# Patient Record
Sex: FEMALE | Race: WHITE
[De-identification: names, ages, dates, MRNs, and addresses within clinical notes are randomized per-mention and may not be internally consistent; named-entity substitution may affect disease eponyms.]

---

## 2018-05-21 ENCOUNTER — HOSPITAL ENCOUNTER (EMERGENCY)
Dept: HOSPITAL 17 - PHEFT | Age: 48
Discharge: HOME | End: 2018-05-21
Payer: COMMERCIAL

## 2018-05-21 VITALS — WEIGHT: 164.24 LBS | HEIGHT: 67 IN | BODY MASS INDEX: 25.78 KG/M2

## 2018-05-21 VITALS
DIASTOLIC BLOOD PRESSURE: 74 MMHG | HEART RATE: 82 BPM | SYSTOLIC BLOOD PRESSURE: 145 MMHG | TEMPERATURE: 98.3 F | RESPIRATION RATE: 16 BRPM | OXYGEN SATURATION: 99 %

## 2018-05-21 DIAGNOSIS — F17.210: ICD-10-CM

## 2018-05-21 DIAGNOSIS — S80.01XA: ICD-10-CM
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
## 2018-05-21 DIAGNOSIS — M25.561: ICD-10-CM

## 2018-05-21 DIAGNOSIS — S40.011A: Primary | ICD-10-CM

## 2018-05-21 PROCEDURE — 99282 EMERGENCY DEPT VISIT SF MDM: CPT

## 2018-05-21 NOTE — PD
HPI


Chief Complaint:  Fall


Time Seen by Provider:  12:35


Travel History


International Travel<30 days:  No


Contact w/Intl Traveler<30days:  No


Traveled to known affect area:  No





History of Present Illness


HPI


47-year-old female presents emergency department for evaluation of right 

shoulder right knee pain after a slip and fall that occurred yesterday.  

Patient states that she slipped on the sidewalk and fell landing on her right 

shoulder and right knee.  She denies any head trauma, loss of consciousness, 

blurred vision.  Denies neck or back pain.  Says that her right shoulder feels 

as if it has been "pulled" and denies any significant pain.  Denies numbness 

tingling the extremities.  Denies any weakness.  Patient points to the lateral 

aspect of her shoulder and deltoid region.  Patient also says that she has some 

mild right knee pain, worse with palpation. Denies radiation of pain, denies 

deformities.  Says that she was to be sure she did not break anything although 

does not believe she did.





PFSH


Past Medical History


Medical History:  Denies Significant Hx


Diminished Hearing:  No


Reproductive:  Yes (OVARIAN CYSTS )


Immunizations Current:  Yes


Tetanus Vaccination:  < 5 Years


Influenza Vaccination:  Yes


Pregnant?:  Not Pregnant





Past Surgical History


Hysterectomy:  Yes (partial)





Social History


Alcohol Use:  No


Tobacco Use:  Yes (1 PPD)


Substance Use:  No





Allergies-Medications


(Allergen,Severity, Reaction):  


Coded Allergies:  


     No Known Allergies (Verified  Adverse Reaction, Unknown, 5/21/18)


Reported Meds & Prescriptions





Reported Meds & Active Scripts


Active


Ibuprofen 800 Mg Tab 800 Mg PO Q8H PRN 5 Days








Review of Systems


Except as stated in HPI:  all other systems reviewed are Neg





Physical Exam


Narrative


GENERAL: Well-nourished, well-developed patient, in NAD


SKIN: Focused skin assessment warm/dry. No rashes or lesions.


HEAD: Normocephalic. Atraumatic.


EYES: No scleral icterus. No injection or drainage. 


THROAT: No pharyngeal injection, exudates, or tonsillar hypertrophy. Airway is 

patent.


NECK: Supple, trachea midline. No JVD or lymphadenopathy. No meningismus.  No 

midline tenderness


CARDIOVASCULAR: Regular rate and rhythm without murmurs, gallops, or rubs. 


RESPIRATORY: Breath sounds equal bilaterally. No accessory muscle use. No 

wheezes, rales, or rhonchi


MUSCULOSKELETAL: No cyanosis, or edema. 


Right shoulder- no ecchymosis no deformities.  Full range of motion without 

restrictions.  Mild tenderness palpation of the lateral deltoid.  Neurovascular 

intact


Right knee-no ecchymosis no deformities.  Full range of motion without 

restrictions.  Negative valgus, negative varus.  Patella flows freely over the 

knee joint.


BACK: No CVA tenderness. No rash.  No point tenderness on palpation of the 

spine.





Data


Data


Last Documented VS





Vital Signs








  Date Time  Temp Pulse Resp B/P (MAP) Pulse Ox O2 Delivery O2 Flow Rate FiO2


 


5/21/18 12:13 98.3 82 16 145/74 (97) 99   











MDM


Medical Decision Making


Medical Screen Exam Complete:  Yes


Emergency Medical Condition:  Yes


Differential Diagnosis


Right knee contusion, right knee fracture, right knee abrasion: Right shoulder 

contusion, abrasion, fracture


Narrative Course


47-year-old female presents emergency department for evaluation of right 

shoulder right knee pain after a slip and fall that occurred yesterday.  

Patient states that she slipped on the sidewalk and fell landing on her right 

shoulder and right knee.  She denies any head trauma, loss of consciousness, 

blurred vision.  Denies neck or back pain.  Says that her right shoulder feels 

as if it has been "pulled" and denies any significant pain.  Denies numbness 

tingling the extremities.  Denies any weakness.  Patient points to the lateral 

aspect of her shoulder and deltoid region.  Patient also says that she has some 

mild right knee pain, worse with palpation. Denies radiation of pain, denies 

deformities.  Says that she was to be sure she did not break anything although 

does not believe she did. 


Vital signs are stable.


Physical exam findings consistent with contusion to the right shoulder and 

right knee.  No obvious deformities.  No tenderness over bony prominences.  

Full range of motion of shoulder and knees.


We discussed imaging studies today although patient would like to defer.  She 

does not believe that she has a fracture and neither do I based off of history 

and physical.


Patient be discharged with ibuprofen.


Advised that she should return for worsening or persistent symptoms.  I 

recommended continued range of motion exercises with her knee and shoulder to 

reduce complications.





Diagnosis





 Primary Impression:  


 Shoulder contusion


 Qualified Codes:  S40.011A - Contusion of right shoulder, initial encounter


 Additional Impression:  


 Knee contusion


 Qualified Codes:  S80.01XA - Contusion of right knee, initial encounter


Referrals:  


Primary Care Physician





***Additional Instructions:  


Use ice or heat for symptom relief. If no contraindications, you may use 

Tylenol or Motrin per package instructions for your pain.


Elevate the joint above the heart to reduce swelling.


You may use compression with Ace wrap or similar to reduce swelling.


If symptoms persist or worsen, return to the emergency department.


Follow up with your primary care physician within 2 days.


Scripts


Ibuprofen (Ibuprofen) 800 Mg Tab


800 MG PO Q8H Y for Pain/Inflammation for 5 Days, #15 TAB 0 Refills


   Prov: Masha Campbell          5/21/18


Disposition:  01 DISCHARGE HOME


Condition:  Stable











Heaven Smalls May 21, 2018 12:51